# Patient Record
Sex: FEMALE | Race: WHITE
[De-identification: names, ages, dates, MRNs, and addresses within clinical notes are randomized per-mention and may not be internally consistent; named-entity substitution may affect disease eponyms.]

---

## 2018-06-10 ENCOUNTER — HOSPITAL ENCOUNTER (EMERGENCY)
Dept: HOSPITAL 63 - ER | Age: 23
Discharge: HOME | End: 2018-06-10
Payer: COMMERCIAL

## 2018-06-10 VITALS — HEIGHT: 63 IN | WEIGHT: 150 LBS | BODY MASS INDEX: 26.58 KG/M2

## 2018-06-10 VITALS — SYSTOLIC BLOOD PRESSURE: 116 MMHG | DIASTOLIC BLOOD PRESSURE: 72 MMHG

## 2018-06-10 DIAGNOSIS — Z3A.01: ICD-10-CM

## 2018-06-10 DIAGNOSIS — O46.91: Primary | ICD-10-CM

## 2018-06-10 LAB
APTT PPP: (no result) S
BACTERIA #/AREA URNS HPF: 0 /HPF
BASOPHILS # BLD AUTO: 0 X10^3/UL (ref 0–0.2)
BASOPHILS NFR BLD: 1 % (ref 0–3)
BILIRUB UR QL STRIP: (no result)
EOSINOPHIL NFR BLD: 0.2 X10^3/UL (ref 0–0.7)
EOSINOPHIL NFR BLD: 3 % (ref 0–3)
ERYTHROCYTE [DISTWIDTH] IN BLOOD BY AUTOMATED COUNT: 12.9 % (ref 11.5–14.5)
FIBRINOGEN PPP-MCNC: CLEAR MG/DL
GLUCOSE UR STRIP-MCNC: (no result) MG/DL
HCT VFR BLD CALC: 39.9 % (ref 36–47)
HGB BLD-MCNC: 13.5 G/DL (ref 12–15.5)
LYMPHOCYTES # BLD: 1.4 X10^3/UL (ref 1–4.8)
LYMPHOCYTES NFR BLD AUTO: 19 % (ref 24–48)
MCH RBC QN AUTO: 28 PG (ref 25–35)
MCHC RBC AUTO-ENTMCNC: 34 G/DL (ref 31–37)
MCV RBC AUTO: 82 FL (ref 79–100)
MONO #: 0.6 X10^3/UL (ref 0–1.1)
MONOCYTES NFR BLD: 8 % (ref 0–9)
NEUT #: 5.4 X10^3UL (ref 1.8–7.7)
NEUTROPHILS NFR BLD AUTO: 70 % (ref 31–73)
NITRITE UR QL STRIP: (no result)
PLATELET # BLD AUTO: 152 X10^3/UL (ref 140–400)
RBC # BLD AUTO: 4.87 X10^6/UL (ref 3.5–5.4)
RBC #/AREA URNS HPF: (no result) /HPF (ref 0–2)
SP GR UR STRIP: 1.01
SQUAMOUS #/AREA URNS LPF: (no result) /LPF
U PREG PATIENT: POSITIVE
UROBILINOGEN UR-MCNC: 0.2 MG/DL
WBC # BLD AUTO: 7.7 X10^3/UL (ref 4–11)
WBC #/AREA URNS HPF: (no result) /HPF (ref 0–4)

## 2018-06-10 PROCEDURE — 81025 URINE PREGNANCY TEST: CPT

## 2018-06-10 PROCEDURE — 86901 BLOOD TYPING SEROLOGIC RH(D): CPT

## 2018-06-10 PROCEDURE — 36415 COLL VENOUS BLD VENIPUNCTURE: CPT

## 2018-06-10 PROCEDURE — 84702 CHORIONIC GONADOTROPIN TEST: CPT

## 2018-06-10 PROCEDURE — 76817 TRANSVAGINAL US OBSTETRIC: CPT

## 2018-06-10 PROCEDURE — 86900 BLOOD TYPING SEROLOGIC ABO: CPT

## 2018-06-10 PROCEDURE — 76801 OB US < 14 WKS SINGLE FETUS: CPT

## 2018-06-10 PROCEDURE — 85025 COMPLETE CBC W/AUTO DIFF WBC: CPT

## 2018-06-10 PROCEDURE — 81001 URINALYSIS AUTO W/SCOPE: CPT

## 2018-06-10 NOTE — PHYS DOC
Past History


Past Medical History:  No Pertinent History


Past Surgical History:  No Surgical History


Smoking:  Non-smoker


Alcohol Use:  None


Drug Use:  None





Adult General


Chief Complaint


Chief Complaint:  VAGINAL BLEEDING PREGNANCY





HPI


HPI





Patient is a 22-year-old  who is approximately 6 weeks pregnant presents 

with vaginal bleeding this morning. She also is having some low back and low 

abdominal discomfort. She states they bleeding intensified after intercourse 

this morning. She denies any fever chills or sweats. She is unsure what her 

blood type is. She states she has not had an ultrasound as of yet.[]





Review of Systems


Review of Systems





Constitutional: Denies fever or chills []


Eyes: Denies change in visual acuity, redness, or eye pain []


HENT: Denies nasal congestion or sore throat []


Respiratory: Denies cough or shortness of breath []


Cardiovascular: No additional information not addressed in HPI []


GI: Per history of present illness[]


: Vaginal bleeding[]


Musculoskeletal: Denies back pain or joint pain []


Integument: Denies rash or skin lesions []


Neurologic: Denies headache, focal weakness or sensory changes []


Endocrine: Denies polyuria or polydipsia []





All other systems were reviewed and found to be within normal limits, except as 

documented in this note.





Allergies


Allergies





Allergies








Coded Allergies Type Severity Reaction Last Updated Verified


 


  No Known Allergies Allergy Unknown  4/16/15 No











Physical Exam


Physical Exam





Constitutional: Well developed, well nourished, no acute distress, non-toxic 

appearance. []


HENT: Normocephalic, atraumatic, bilateral external ears normal, oropharynx 

moist, no oral exudates, nose normal. []


Eyes: PERRLA, EOMI, conjunctiva normal, no discharge. [] 


Neck: Normal range of motion, no tenderness, supple, no stridor. [] 


Cardiovascular:Heart rate regular rhythm, no murmur []


Lungs & Thorax:  Bilateral breath sounds clear to auscultation []


Abdomen: Bowel sounds normal, soft, no tenderness, no masses, no pulsatile 

masses. [] 


Skin: Warm, dry, no erythema, no rash. [] 


Back: No tenderness, no CVA tenderness. [] 


Extremities: No tenderness, no cyanosis, no clubbing, ROM intact, no edema. [] 


Neurologic: Alert and oriented X 3, normal motor function, normal sensory 

function, no focal deficits noted. []


Psychologic: Affect normal, judgement normal, mood normal. []





Current Patient Data


Lab Results





 Laboratory Tests








Test


 6/10/18


14:57 6/10/18


15:40


 


POC Urine HCG, Qualitative


 hcg positive


(Negative) 





 


Urine Collection Type  Unknown  


 


Urine Color  Straw  


 


Urine Clarity  Clear  


 


Urine pH  7.0  


 


Urine Specific Gravity  1.010  


 


Urine Protein


 


 Neg


(NEG-TRACE)


 


Urine Glucose (UA)


 


 Neg mg/dL


(NEG)


 


Urine Ketones (Stick)


 


 Neg mg/dL


(NEG)


 


Urine Blood  Mod (NEG)  


 


Urine Nitrite  Neg (NEG)  


 


Urine Bilirubin  Neg (NEG)  


 


Urine Urobilinogen Dipstick


 


 0.2 mg/dL (0.2


mg/dL)


 


Urine Leukocyte Esterase  Neg (NEG)  


 


Urine RBC


 


 6-10 /HPF


(0-2)


 


Urine WBC


 


 Occ /HPF (0-4)





 


Urine Squamous Epithelial


Cells 


 None /LPF  





 


Urine Bacteria


 


 0 /HPF (0-FEW)





 


Urine Pregnancy Test  Pending  











EKG


EKG


[]





Radiology/Procedures


Radiology/Procedures





FINDINGS:


 


The uterus measures 9.0 x 6.0 x 4.2 cm. There is a circumscribed 


gestational sac within the fundus of the uterus or within the fundal 


endometrium with the gestational sac diameter of 0.52 cm compatible with a


gestational age of 5 weeks 2 days. There is no suspicious uterine mass. 


Endometrium is within normal limits.


 


Right ovary measures 1.5 by site right ovary measures 2.2 x 1.5 x 3.7 cm. 


Left ovary measures 2.2 x 2.2 x 2.2 cm. Arterial and venous waveform 


identified bilaterally. Small volume simple free fluid is identified 


within the pelvis.


 


IMPRESSION:


1. A gestational sac is identified within the uterus without yolk sac or 


embryo. Mean sac diameter is compatible with a gestational age of 5 weeks 


and 2 days. Findings favor early intrauterine pregnancy. However, 


short-term follow-up beta hCG is recommended to assess for pregnancy 


failure given the provided beta hCG value.





Course & Med Decision Making


Course & Med Decision Making


Pertinent Labs and Imaging studies reviewed. (See chart for details)





[ED course: Evaluation reveals a 22-year-old  proximal like 5 or 6 weeks 

pregnant with a quantitative hCG of only 136. She did have bleeding today. I 

talked with the patient her mother and her significant other about what this 

means. I let them know that I was very concerned that this was a miscarriage. 

However we would do the ultrasound I let them know that we would likely not see 

much. She hasn't appointment with an obstetrician as scheduled and will need to 

follow-up as scheduled there for recheck. Also let her know that she would need 

to return should the pain intensified. I will be checking this case out to Dr. Fernandez at 1700.








18:45:  Ultrasound results are reviewed. The patient has a gestational sac in 

the uterus. There is no fetal pole present. HCG is 16. Plan is for discharge 

home. The patient is agreeable. All of her results are reviewed and all of her 

questions are answered prior to discharge. She is advised to use pelvic rest. 

She will contact her primary OB doctor tomorrow morning to schedule a repeat 

hCG level in 4 days. During the interim, she is advised to come back to the ER 

for any lightheadedness, dizziness, bleeding that soaks more than 1 pad per 

hour for 3 consecutive hours or fever. Patient verbalizes understanding of 

these return precautions.





Dragon Disclaimer


Dragon Disclaimer


This electronic medical record was generated, in whole or in part, using a 

voice recognition dictation system.





Departure


Departure:


Referrals:  


DELIA BOB MD (PCP)











ROBBIE ASHLEY DO Jace 10, 2018 16:08


DHRUV FERNANDEZ DO Jace 10, 2018 18:46

## 2018-06-10 NOTE — RAD
Obstetric pelvic ultrasound 6/10/2018

 

INDICATION: B . Bleeding and cramping.

 

COMPARISON: None available.

 

TECHNIQUE: Transabdominal and transvaginal imaging of the pelvis is 

performed utilizing grayscale, color Doppler and spectral waveform 

analysis.

 

FINDINGS:

 

The uterus measures 9.0 x 6.0 x 4.2 cm. There is a circumscribed 

gestational sac within the fundus of the uterus or within the fundal 

endometrium with the gestational sac diameter of 0.52 cm compatible with a

gestational age of 5 weeks 2 days. There is no suspicious uterine mass. 

Endometrium is within normal limits.

 

Right ovary measures 1.5 by site right ovary measures 2.2 x 1.5 x 3.7 cm. 

Left ovary measures 2.2 x 2.2 x 2.2 cm. Arterial and venous waveform 

identified bilaterally. Small volume simple free fluid is identified 

within the pelvis.

 

IMPRESSION:

1. A gestational sac is identified within the uterus without yolk sac or 

embryo. Mean sac diameter is compatible with a gestational age of 5 weeks 

and 2 days. Findings favor early intrauterine pregnancy. However, 

short-term follow-up beta hCG is recommended to assess for pregnancy 

failure given the provided beta hCG value.

 

Electronically signed by: Danii Rebollar MD (6/10/2018 6:30 PM) 

[facility]

## 2018-09-13 ENCOUNTER — HOSPITAL ENCOUNTER (EMERGENCY)
Dept: HOSPITAL 63 - ER | Age: 23
Discharge: HOME | End: 2018-09-13
Payer: COMMERCIAL

## 2018-09-13 VITALS — WEIGHT: 148.81 LBS | BODY MASS INDEX: 26.37 KG/M2 | HEIGHT: 63 IN

## 2018-09-13 VITALS — SYSTOLIC BLOOD PRESSURE: 124 MMHG | DIASTOLIC BLOOD PRESSURE: 61 MMHG

## 2018-09-13 DIAGNOSIS — R55: Primary | ICD-10-CM

## 2018-09-13 DIAGNOSIS — R10.2: ICD-10-CM

## 2018-09-13 DIAGNOSIS — Z88.6: ICD-10-CM

## 2018-09-13 DIAGNOSIS — F32.9: ICD-10-CM

## 2018-09-13 DIAGNOSIS — R79.1: ICD-10-CM

## 2018-09-13 DIAGNOSIS — R73.9: ICD-10-CM

## 2018-09-13 DIAGNOSIS — J45.909: ICD-10-CM

## 2018-09-13 LAB
ALBUMIN SERPL-MCNC: 3.9 G/DL (ref 3.4–5)
ALP SERPL-CCNC: 47 U/L (ref 46–116)
ALT SERPL-CCNC: 18 U/L (ref 14–59)
ANION GAP SERPL CALC-SCNC: 7 MMOL/L (ref 6–14)
APTT PPP: YELLOW S
AST SERPL-CCNC: 17 U/L (ref 15–37)
BACTERIA #/AREA URNS HPF: (no result) /HPF
BASOPHILS # BLD AUTO: 0 X10^3/UL (ref 0–0.2)
BASOPHILS NFR BLD: 0 % (ref 0–3)
BILIRUB DIRECT SERPL-MCNC: 0.1 MG/DL (ref 0–0.2)
BILIRUB SERPL-MCNC: 0.5 MG/DL (ref 0.2–1)
BILIRUB UR QL STRIP: (no result)
CA-I SERPL ISE-MCNC: 11 MG/DL (ref 7–20)
CALCIUM SERPL-MCNC: 8.8 MG/DL (ref 8.5–10.1)
CHLORIDE SERPL-SCNC: 101 MMOL/L (ref 98–107)
CO2 SERPL-SCNC: 27 MMOL/L (ref 21–32)
CREAT SERPL-MCNC: 0.7 MG/DL (ref 0.6–1)
EOSINOPHIL NFR BLD: 0 % (ref 0–3)
EOSINOPHIL NFR BLD: 0 X10^3/UL (ref 0–0.7)
ERYTHROCYTE [DISTWIDTH] IN BLOOD BY AUTOMATED COUNT: 12.8 % (ref 11.5–14.5)
FIBRINOGEN PPP-MCNC: CLEAR MG/DL
GFR SERPLBLD BASED ON 1.73 SQ M-ARVRAT: 105.6 ML/MIN
GLUCOSE SERPL-MCNC: 149 MG/DL (ref 70–99)
GLUCOSE UR STRIP-MCNC: (no result) MG/DL
HCT VFR BLD CALC: 38.1 % (ref 36–47)
HGB BLD-MCNC: 12.9 G/DL (ref 12–15.5)
LYMPHOCYTES # BLD: 0.5 X10^3/UL (ref 1–4.8)
LYMPHOCYTES NFR BLD AUTO: 4 % (ref 24–48)
MCH RBC QN AUTO: 28 PG (ref 25–35)
MCHC RBC AUTO-ENTMCNC: 34 G/DL (ref 31–37)
MCV RBC AUTO: 82 FL (ref 79–100)
MONO #: 0.1 X10^3/UL (ref 0–1.1)
MONOCYTES NFR BLD: 1 % (ref 0–9)
NEUT #: 11.6 X10^3UL (ref 1.8–7.7)
NEUTROPHILS NFR BLD AUTO: 95 % (ref 31–73)
NITRITE UR QL STRIP: (no result)
PLATELET # BLD AUTO: 120 X10^3/UL (ref 140–400)
POTASSIUM SERPL-SCNC: 3.7 MMOL/L (ref 3.5–5.1)
PREG TEST PT QUAL: POSITIVE
PROT SERPL-MCNC: 7 G/DL (ref 6.4–8.2)
RBC # BLD AUTO: 4.64 X10^6/UL (ref 3.5–5.4)
RBC #/AREA URNS HPF: (no result) /HPF (ref 0–2)
SODIUM SERPL-SCNC: 135 MMOL/L (ref 136–145)
SP GR UR STRIP: 1.02
SQUAMOUS #/AREA URNS LPF: (no result) /LPF
UROBILINOGEN UR-MCNC: 1 MG/DL
WBC # BLD AUTO: 12.2 X10^3/UL (ref 4–11)
WBC #/AREA URNS HPF: (no result) /HPF (ref 0–4)

## 2018-09-13 PROCEDURE — 76830 TRANSVAGINAL US NON-OB: CPT

## 2018-09-13 PROCEDURE — 80048 BASIC METABOLIC PNL TOTAL CA: CPT

## 2018-09-13 PROCEDURE — 36415 COLL VENOUS BLD VENIPUNCTURE: CPT

## 2018-09-13 PROCEDURE — 96361 HYDRATE IV INFUSION ADD-ON: CPT

## 2018-09-13 PROCEDURE — 84703 CHORIONIC GONADOTROPIN ASSAY: CPT

## 2018-09-13 PROCEDURE — 74177 CT ABD & PELVIS W/CONTRAST: CPT

## 2018-09-13 PROCEDURE — 93005 ELECTROCARDIOGRAM TRACING: CPT

## 2018-09-13 PROCEDURE — 96360 HYDRATION IV INFUSION INIT: CPT

## 2018-09-13 PROCEDURE — 71275 CT ANGIOGRAPHY CHEST: CPT

## 2018-09-13 PROCEDURE — 85379 FIBRIN DEGRADATION QUANT: CPT

## 2018-09-13 PROCEDURE — 85025 COMPLETE CBC W/AUTO DIFF WBC: CPT

## 2018-09-13 PROCEDURE — 99285 EMERGENCY DEPT VISIT HI MDM: CPT

## 2018-09-13 PROCEDURE — 76856 US EXAM PELVIC COMPLETE: CPT

## 2018-09-13 PROCEDURE — 81001 URINALYSIS AUTO W/SCOPE: CPT

## 2018-09-13 PROCEDURE — 80076 HEPATIC FUNCTION PANEL: CPT

## 2018-09-13 NOTE — EKG
[facility] [address], [place] [address]

Test Date:    2018               Test Time:    13:20:36

Pat Name:     JOSE FRANCISCO JAMISON            Department:   

Patient ID:   [id number]           Room:          

Gender:       F                        Technician:   

:          [id number]               Requested By: GRACE VELEZ

Order Number: [id number]            Reading MD:   Lefty Olivo MD

                                 Measurements

Intervals                              Axis          

Rate:         65                       P:            54

MT:           140                      QRS:          62

QRSD:         88                       T:            47

QT:           392                                    

QTc:          408                                    

                           Interpretive Statements

SINUS RHYTHM



Electronically Signed On 2018 12:24:12 CDT by Lefty Olivo MD

## 2018-09-13 NOTE — RAD
CT ABD PELV W/ IV CONTRST ONLY

 

Indication: ELEVATED D-DIMER, ABDOMINAL PAIN, CRAMPING, PT HAD 

MISCARRIAGE, PT HAD D&C TODAY. 75MLS OMNI 300 IV CONTRAST

 

Exposure: One or more of the following individualized dose reduction 

techniques were utilized for this examination:  1. Automated exposure 

control  2. Adjustment of the mA and/or kV according to patient size  3. 

Use of iterative reconstruction technique.

 

Comparison: None are available.

Contrast: Intravenous contrast was given. No oral contrast per request.

 

FINDINGS:

Lower thorax: Lung bases are clear.

 

Liver: Unremarkable

Spleen: Unremarkable

Pancreas: Unremarkable

 

Adrenals: No evidence of mass.

Kidneys: No obvious mass.

Urinary tracts: No hydronephrosis.

 

Gallbladder: No calcified stone

Lymph nodes: No significant enlargement

 

There is some prominent vascular structures around the upper pole left 

kidney, may represent varices.

 

Vessels:

*  Aorta: Nonaneurysmal

*  Major aortic branches: Grossly patent.

*  Portal venous: Patent

 

GI tract: No evidence of acute colitis. No evidence of bowel obstruction.

Appendix is not clearly visualized.

 

Reproductive organs: The uterus is enlarged. There is extensive thickening

of the endometrium with heterogeneous tissue. The endometrium measures 6.5

cm wide. There is an irregular walled structure in the right adnexa likely

a collapsing right ovarian cyst measuring 17 mm.

Urinary bladder: Unremarkable.

Peritoneum: No evidence of pneumoperitoneum. No free fluid.

Abdominal wall:Unremarkable

 

Spine: No acute findings.

Bones: No destructive process identified.

 

IMPRESSION: 

1. Severe endometrial enlargement or distention. Considerations include 

endometrial hemorrhage or hematoma, or other etiology such as endometritis

or endometrial mass.

2. Prominent vascular structures around the superior pole of the left 

kidney, may represent varices.

 

Electronically signed by: Neto Madison MD (9/13/2018 4:11 PM) [facility]

## 2018-09-13 NOTE — RAD
Ultrasound pelvis 9/13/2018

 

INDICATION: History of dilatation and curettage.

 

COMPARISON: CT abdomen/pelvis 9/13/2018.

 

TECHNIQUE: Multiple sonographic images of the pelvis were obtained 

utilizing transabdominal and transvaginal imaging. Grayscale, color 

Doppler and spectral waveform analysis were utilized.

 

FINDINGS:

 

The uterus measures 11.4 x 8.2 x 6.4 cm. The endometrium measures 51 mm in

maximal thickness. Heterogeneous echogenicity is identified within the 

endometrium without internal vascularity. Findings are most suggestive of 

blood products. Small volume free fluid is identified within the pelvis. 

No suspicious myometrial mass is visualized.

 

Left ovary measures 2.3 x 1.7 x 2.4 cm. The right ovary measures 2.3 x 2.3

x 2.4 cm. Ovaries are not described. No suspicious adnexal masses are 

identified. Arterial and venous waveform identified bilaterally at the 

time of imaging.

 

IMPRESSION:

 

Significantly thickened and heterogeneous endometrium is compatible with 

blood products. Differential consideration would include retained products

of conception, however provided the history of dilatation and curettage, 

this seems unlikely. No internal vascularity is identified to suggest an 

underlying vascular malformation.

 

Small volume simple free fluid is identified within the pelvis.

 

Electronically signed by: Danii Rebollar MD (9/13/2018 4:45 PM) 

[facility]

## 2018-09-13 NOTE — PHYS DOC
Past History


Past Medical History:  Asthma, Depression, Other


Past Surgical History:  Other


Smoking:  Non-smoker


Alcohol Use:  None


Drug Use:  None





Adult General


Chief Complaint


Chief Complaint:  POST-OP PROBLEM





HPI


HPI


23-year-old female who had a D&C today at [facility] 

outpatient surgery due to loss of fetal heartbeat. She reports this was an 

intrauterine pregnancy. She has mild cramping in the lower pelvis and she was 

at the pharmacy today when she had a syncopal episode. Prior to the syncopal 

episode, she felt mildly lightheaded and nauseous. She denies any pain. She 

denies vomiting today. She was able to take oral intake after her surgery.





Review of systems is negative for chest pain shortness of breath. Positive for 

abdominal pain. Negative for headache. All other review of systems is negative 

unless otherwise noted in history of present illness.





ED course: 23-year-old female presenting to the emergency department today with 

syncope after having a D&C. On arrival the patient is afebrile with a normal 

heart rate. She is well-appearing on examination. Regular rate and rhythm. 

Clear lungs bilaterally. Otherwise no signs of DVT in the lower legs. EKG 

obtained and reviewed by myself shows sinus rhythm with a regular rate. ST 

segments congruent. No evidence of Brugada, Binh-Parkinson-White, QT is within 

normal limits, no signs of hypertrophic obstructive cardiomyopathy. blood work 

sent. White blood cell count is just above the reference range of normal. 

Platelets are just below the reference range of normal. Chemistry panel shows 

mild mild hyperglycemia. Pregnancy test is positive, patient was pregnant and 

this is why the patient needed a D&C. D-dimer is elevated. CT angiogram is 

negative. Urinalysis not suggestive of infection. Otherwise CAT scan of the 

abdomen shows endometrial enlargement. Patient is having mild vaginal bleeding. 

Ultrasound shows heterogenous endometrium compatible with blood products. I 

spoke with Dr. SOUSA who is on-call for Dr. Carlson. She is OB/GYN on-call at [facility] 

where the patient had surgery. The patient is hemodynamically doing quite well 

and does not show any signs of shock. Her pain is better she is able to walk in 

the emergency department without any lightheadedness. OB/GYN recommends 

discharge versus observation depending on the patient's preference. I explained 

the risks and benefits of both to the patient and we used shared should 

decision making. Patient understands risks and benefits and has medical 

decision making capacity.The patient has been examined and was not found to 

have an emergency medical condition. The patient was then discharged home in 

stable condition to follow up with OB/GYN tomorrow. They were to return if 

their symptoms worsened or if they were concerned for any reason.  They were 

also instructed to return to the emergency department if they were unable to 

get the recommended and appropriate follow-up.  Face-to-face discharge 

instructions and return precautions were given. Patient's questions were 

answered to their satisfaction. Patient is comfortable with plan.





Review of Systems


Review of Systems


SEE ABOVE.





Current Medications


Current Medications





Current Medications








 Medications


  (Trade)  Dose


 Ordered  Sig/Janell  Start Time


 Stop Time Status Last Admin


Dose Admin


 


 Sodium Chloride  1,000 ml @ 


 1,000 mls/hr  1X  ONCE  9/13/18 14:00


 9/13/18 14:59   














Allergies


Allergies





Allergies








Coded Allergies Type Severity Reaction Last Updated Verified


 


  tramadol Allergy Unknown  9/13/18 Yes











Physical Exam


Physical Exam


SEE ABOVE





Constitutional: Well developed, well nourished, no acute distress, non-toxic 

appearance. []


HENT: Normocephalic, atraumatic, bilateral external ears normal, oropharynx 

moist, no oral exudates, nose normal. []


Eyes: PERRLA, EOMI, conjunctiva normal, no discharge. [] 


Neck: Normal range of motion, no tenderness, supple, no stridor. [] 


Cardiovascular:Heart rate regular rhythm, no murmur []


Lungs & Thorax:  Bilateral breath sounds clear to auscultation []


Abdomen: Bowel sounds normal, soft, no tenderness, no masses, no pulsatile 

masses. [] 


Skin: Warm, dry, no erythema, no rash. [] 


Back: No tenderness, no CVA tenderness. [] 


Extremities: No tenderness, no cyanosis, no clubbing, ROM intact, no edema. [] 


Neurologic: Alert and oriented X 3, normal motor function, normal sensory 

function, no focal deficits noted. []


Psychologic: Affect normal, judgement normal, mood normal. []





Current Patient Data


Vital Signs





 Vital Signs








  Date Time  Temp Pulse Resp B/P (MAP) Pulse Ox O2 Delivery O2 Flow Rate FiO2


 


9/13/18 13:13  68 18  98 Room Air  








Lab Results





 Laboratory Tests








Test


 9/13/18


13:31


 


Serum Pregnancy Test,


Qualitative Positive (NEG)














EKG


EKG


[]





Radiology/Procedures


Radiology/Procedures


[]





Course & Med Decision Making


Course & Med Decision Making


Pertinent Labs and Imaging studies reviewed. (See chart for details)





[]





Dragon Disclaimer


Dragon Disclaimer


This electronic medical record was generated, in whole or in part, using a 

voice recognition dictation system.





Departure


Departure:


Impression:  


 Primary Impression:  


 Syncope


Disposition:  01 HOME, SELF-CARE


Condition:  STABLE


Referrals:  


DELIA BOB MD (PCP)


Patient Instructions:  Syncope





Additional Instructions:  


Thank you for allowing us to participate in your care today.





Be sure to drink lots of fluids and follow-up with your OB/GYN tomorrow.





Return to the emergency department you have any new or worsening symptoms, or 

if you are concerned for any reason. Return to emergency department if you have 

any  new or concerning symptoms including but not limited to fever, chills, 

nausea, vomiting, intractable pain, any new rashes, chest pain, shortness of air

, uncontrolled bleeding, difficulty breathing, and/or vision loss.





Call your Primary Doctor tomorrow and inform them of your visit today.  If you 

do not have a primary care provider we are happy to provide you with a list of 

our primary care providers contact information. 





This condition should be evaluated by your primary care physician and any 

recommended consulting services for continued management within 2-3 days after 

discharge. If at any time, you are having difficulty getting into your primary 

care doctor or a specialist, return to the emergency department.











GRACE VELEZ MD Sep 13, 2018 14:20

## 2018-09-13 NOTE — RAD
EXAM: CT angiography of the chest with intravenous contrast.

 

HISTORY: Elevated d-dimer. Pain. Recent miscarriage with D&C.

 

TECHNIQUE: Computed tomographic images of the chest were obtained 

following the administration of 75 cc Omnipaque 300 intravenous contrast 

according to angiography protocol. Multiplanar reformatting was performed 

and 3-dimensional maximum intensity projection images were obtained. 

*One or more of the following individualized dose reduction techniques 

were utilized for this examination:  

1. Automated exposure control.  

2. Adjustment of the mA and/or kV according to patient size.  

3. Use of iterative reconstruction technique.

 

COMPARISON: None.

 

FINDINGS: There is no convincing pulmonary embolism. There is no aortic 

aneurysm or dissection. There is increased soft tissue within the anterior

mediastinum due to residual thymus, within normal limits for patient age. 

There are prominent hilar lymph nodes without significant lymphadenopathy.

There is no pneumothorax or pleural effusion. There is no infiltrate or 

suspicious pulmonary nodule. There is a calcified granuloma with adjacent 

small pleural-based opacity within the medial right upper lobe likely due 

to pleural parenchymal scarring. There is a prominent collateral vessel 

between the spleen and left kidney, not formally assessed on this exam. 

There is no suspicious osseous lesion.

 

IMPRESSION: 

1. No convincing pulmonary embolism or alternative acute thoracic finding.

2. Suspected small focus of pleural parenchymal scarring or healed 

granulomatous disease within the medial right upper lobe.

 

Electronically signed by: Sona Warren MD (9/13/2018 3:38 PM) [place] [id number]